# Patient Record
Sex: MALE | Race: WHITE
[De-identification: names, ages, dates, MRNs, and addresses within clinical notes are randomized per-mention and may not be internally consistent; named-entity substitution may affect disease eponyms.]

---
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
## 2024-03-29 ENCOUNTER — APPOINTMENT (OUTPATIENT)
Dept: NEUROLOGY | Facility: CLINIC | Age: 19
End: 2024-03-29

## 2024-04-21 ENCOUNTER — NON-APPOINTMENT (OUTPATIENT)
Age: 19
End: 2024-04-21

## 2024-04-22 ENCOUNTER — EMERGENCY (EMERGENCY)
Facility: HOSPITAL | Age: 19
LOS: 0 days | Discharge: HOME | End: 2024-04-22
Attending: EMERGENCY MEDICINE
Payer: COMMERCIAL

## 2024-04-22 VITALS
OXYGEN SATURATION: 98 % | HEART RATE: 92 BPM | TEMPERATURE: 97 F | RESPIRATION RATE: 18 BRPM | DIASTOLIC BLOOD PRESSURE: 82 MMHG | SYSTOLIC BLOOD PRESSURE: 142 MMHG

## 2024-04-22 VITALS — HEIGHT: 75 IN

## 2024-04-22 DIAGNOSIS — M54.2 CERVICALGIA: ICD-10-CM

## 2024-04-22 DIAGNOSIS — M43.6 TORTICOLLIS: ICD-10-CM

## 2024-04-22 PROCEDURE — 96372 THER/PROPH/DIAG INJ SC/IM: CPT

## 2024-04-22 PROCEDURE — 99284 EMERGENCY DEPT VISIT MOD MDM: CPT

## 2024-04-22 PROCEDURE — 99283 EMERGENCY DEPT VISIT LOW MDM: CPT | Mod: 25

## 2024-04-22 RX ORDER — IBUPROFEN 200 MG
1 TABLET ORAL
Qty: 28 | Refills: 0
Start: 2024-04-22 | End: 2024-04-28

## 2024-04-22 RX ORDER — DIAZEPAM 5 MG
5 TABLET ORAL ONCE
Refills: 0 | Status: DISCONTINUED | OUTPATIENT
Start: 2024-04-22 | End: 2024-04-22

## 2024-04-22 RX ORDER — KETOROLAC TROMETHAMINE 30 MG/ML
30 SYRINGE (ML) INJECTION ONCE
Refills: 0 | Status: DISCONTINUED | OUTPATIENT
Start: 2024-04-22 | End: 2024-04-22

## 2024-04-22 RX ORDER — LIDOCAINE 4 G/100G
1 CREAM TOPICAL
Qty: 14 | Refills: 0
Start: 2024-04-22 | End: 2024-04-28

## 2024-04-22 RX ORDER — LIDOCAINE 4 G/100G
1 CREAM TOPICAL ONCE
Refills: 0 | Status: COMPLETED | OUTPATIENT
Start: 2024-04-22 | End: 2024-04-22

## 2024-04-22 RX ORDER — LIDOCAINE HCL 20 MG/ML
5 VIAL (ML) INJECTION
Refills: 0
Start: 2024-04-22

## 2024-04-22 RX ORDER — CYCLOBENZAPRINE HYDROCHLORIDE 10 MG/1
10 TABLET, FILM COATED ORAL ONCE
Refills: 0 | Status: COMPLETED | OUTPATIENT
Start: 2024-04-22 | End: 2024-04-22

## 2024-04-22 RX ORDER — CYCLOBENZAPRINE HYDROCHLORIDE 10 MG/1
1 TABLET, FILM COATED ORAL
Qty: 21 | Refills: 0
Start: 2024-04-22 | End: 2024-04-28

## 2024-04-22 RX ADMIN — Medication 5 MILLIGRAM(S): at 12:31

## 2024-04-22 RX ADMIN — Medication 30 MILLIGRAM(S): at 11:33

## 2024-04-22 RX ADMIN — LIDOCAINE 1 PATCH: 4 CREAM TOPICAL at 12:32

## 2024-04-22 RX ADMIN — CYCLOBENZAPRINE HYDROCHLORIDE 10 MILLIGRAM(S): 10 TABLET, FILM COATED ORAL at 11:30

## 2024-04-22 NOTE — ED ADULT NURSE NOTE - OBJECTIVE STATEMENT
The pt is a 18 y/o male A&OX4 ambulatory from home presenting to the ED c/o neck pain and cold symptoms. Mom reports that they went to urgent care this morning and told them to go to the ER. Pt reports that he has been having a cough and runny noise x7 days and woke up this morning with R sided neck stiffness. Pt reports that he can't move head to the R side. Respirations even and unlabored, no distress noted. Mom at bedside.

## 2024-04-22 NOTE — ED STATDOCS - PATIENT PORTAL LINK FT
You can access the FollowMyHealth Patient Portal offered by [facility] by registering at the following website: [email or web address]. By joining [employer]’s FollowMyHealth portal, you will also be able to view your health information using other applications (apps) compatible with our system.

## 2024-04-22 NOTE — ED ADULT TRIAGE NOTE - CHIEF COMPLAINT QUOTE
Patient presents to the ER from urgent care with complaints of neck stiffness, worsening pain with movement. Patient has had pain, cough, congestion, headache and swollen lymph nodes in neck for approx 3 days since coming home from college. Denies fever, N/V, chest pain, shortness of breath.

## 2024-04-22 NOTE — ED STATDOCS - PHYSICAL EXAMINATION
Constitutional: NAD AOx3  Eyes: PERRL EOMI  Head: Normocephalic atraumatic  Mouth: MMM. normal oropharynx.   Cardiac: regular rate and rhythm  Resp: Lungs CTAB  GI: Abd s/nd/nt  MSK: Right cervical paraspinal TTP. SCM TTP. Head turned to left for comfort and when turning to right he has pain. No midline TTP. No lymphadenopathy.   Neuro: CN2-12 grossly intact, RANKIN x 4  Skin: No visible rashes

## 2024-04-22 NOTE — ED ADULT NURSE NOTE - NSFALLRISKINTERV_ED_ALL_ED

## 2024-04-22 NOTE — ED STATDOCS - ADDITIONAL NOTES AND INSTRUCTIONS:
Pt with acute torticollis on the right side of his neck will require additional medical treatment to relieve the muscular pain before it is safe to drive and return to classes.  This medication will make the patient drowsy.  He should not be driving or operating machinery while taking it.

## 2024-04-22 NOTE — ED STATDOCS - OBJECTIVE STATEMENT
18 y/o male presents to the ED from urgent care for right sided neck swelling and stiffness starting this morning. Pt reports he had a fever and HA 2 days ago. Pt has not had a fever since. Pt worked out yesterday. NKDA. No other complaints at this time.

## 2024-04-22 NOTE — ED STATDOCS - PROGRESS NOTE DETAILS
19-year-old male with no known past medical history presents to the ED sent in from urgent care for evaluation of increased lymph nodes to the right side of his neck to rule out meningitis patient reports that he woke up this morning with pain to the right side of his neck only difficulty turning his head more comfortable holding his head turned to the left side patient denies trauma to head or neck does report that he worked out yesterday and did new exercises overhead for his back patient denies numbness or tingling in upper extremities no weakness to upper extremities no headache no high fevers no chills no nausea no vomiting no visual changes no rash patient did have a low-grade fever 2 days ago denies any sort of sore throat did have mild cough on exam patient holding head turned to left side right trapezius muscles sternocleidomastoid muscles are tender to palp and spasms nontender cervical spine no cervical lymphadenopathy palp oropharynx pink without lesions chest is clear to auscultation bilaterally with regular rate and rhythm full active range of motion of upper extremities neurovascularly intact will reevaluate patient's approximately 45 minutes to 1 hour after receiving medications to see if there is improvement heating pads applied to right side of neck.  Debbie Montes PA-C On reeval patient reports minimal improvement to right side neck stiffness after Flexeril and Toradol on reexam patient still holding head turned towards left side with right-sided musculature spasm and tender to palp no palpable lymphadenopathy will add Valium and lido patch and reevaluate.  Debbie Montes PA-C

## 2024-04-22 NOTE — ED STATDOCS - NSFOLLOWUPINSTRUCTIONS_ED_ALL_ED_FT
--Euvolemic on exam.   --EF newly reduced from 45-50% in 2017 down to 25-30% on 7/2/22.   --Discuss initiation of ACEi/ARB/ARNi w/ Dr. Eduardo prior to discharge.   --Plan for NST on Tuesday 7/5/22 to r/o ischemic etiology of LVEF reduction. Per Dr. Eduardo, no need to transition from Eliquis to Heparin gtt for NST. Acute Torticollis, Adult  Torticollis is a condition in which the muscles of the neck tighten (contract) abnormally, causing the neck to twist and the head to move into an unnatural position. Torticollis that develops suddenly is called acute torticollis. People with acute torticollis may have trouble turning their head. The condition can be painful and may range from mild to severe.    What are the causes?  This condition may be caused by:  Sleeping in an awkward position. This is common.  Extending or twisting the neck muscles beyond their normal position.  An injury to the neck muscles.  An infection.  A tumor.  Certain medicines.  Long-lasting spasms of the neck muscles.  In some cases, the cause may not be known.    What increases the risk?  You are more likely to develop this condition if:  You have a condition associated with loose ligaments, such as Down syndrome.  You have a brain condition that affects vision, such as strabismus.  What are the signs or symptoms?  The main symptom of this condition is tilting of the head to one side. Other symptoms include:  Pain in the neck.  Trouble turning the head from side to side or up and down.  How is this diagnosed?  This condition may be diagnosed based on:  A physical exam.  Your medical history.  Imaging tests, such as:  An X-ray.  An ultrasound.  A CT scan.  An MRI.  How is this treated?  Treatment for this condition depends on what is causing the condition. Mild cases may go away without treatment. Treatment for more serious cases may include:  Medicines or shots to relax the muscles.  Other medicines, such as antibiotics, to treat the underlying cause.  Wearing a soft neck collar.  Physical therapy and stretching exercises to improve movement and strength in your neck.  Neck massage.  In severe cases, surgery may be needed to repair dislocated or broken bones or to treat nerves in the neck.    Follow these instructions at home:    Take over-the-counter and prescription medicines only as told by your health care provider.  Do stretching exercises and massage your neck as told by your health care provider.  If directed, apply heat to the affected area as often as told by your health care provider. Use the heat source that your health care provider recommends, such as a moist heat pack or a heating pad.  Place a towel between your skin and the heat source.  Leave the heat on for 20–30 minutes.  Remove the heat if your skin turns bright red. This is especially important if you are unable to feel pain, heat, or cold. You have a greater risk of getting burned.  If you wake up with torticollis after sleeping, check your bed or sleeping area. Look for lumpy pillows or unusual objects. Make sure your bed and sleeping area are comfortable.  Keep all follow-up visits. This is important.  Contact a health care provider if:  You have a fever.  Your symptoms do not improve or they get worse.  Get help right away if:  You have trouble breathing.  You make loud, high-pitched sounds when you breathe, most often when you breathe in (stridor).  You start to drool.  You have trouble swallowing or pain when swallowing.  You develop numbness or weakness in your hands or feet.  You have changes in your speech, understanding, or vision.  You are in severe pain.  You cannot move your head or neck.  These symptoms may represent a serious problem that is an emergency. Do not wait to see if the symptoms will go away. Get medical help right away. Call your local emergency services (911 in the [place] Do not drive yourself to the hospital.    Summary  Torticollis is a condition in which the muscles of the neck tighten (contract) abnormally, causing the neck to twist and the head to move into an unnatural position. Torticollis that develops suddenly is called acute torticollis.  Treatment for this condition depends on what is causing the condition. Mild cases may go away without treatment.  Do stretching exercises and massage your neck as told by your health care provider. You may also be instructed to apply heat to the area.  Contact your health care provider if your symptoms do not improve or they get worse.  This information is not intended to replace advice given to you by your health care provider. Make sure you discuss any questions you have with your health care provider.    Document Revised: 04/16/2021 Document Reviewed: 04/16/2021  [employer] Patient Education © 2024 [employer]

## 2024-10-28 ENCOUNTER — APPOINTMENT (OUTPATIENT)
Dept: DERMATOLOGY | Facility: CLINIC | Age: 19
End: 2024-10-28

## 2024-12-16 ENCOUNTER — APPOINTMENT (OUTPATIENT)
Dept: DERMATOLOGY | Facility: CLINIC | Age: 19
End: 2024-12-16
Payer: COMMERCIAL

## 2024-12-16 DIAGNOSIS — Z12.83 ENCOUNTER FOR SCREENING FOR MALIGNANT NEOPLASM OF SKIN: ICD-10-CM

## 2024-12-16 DIAGNOSIS — L81.4 OTHER MELANIN HYPERPIGMENTATION: ICD-10-CM

## 2024-12-16 DIAGNOSIS — B07.9 VIRAL WART, UNSPECIFIED: ICD-10-CM

## 2024-12-16 DIAGNOSIS — D22.9 MELANOCYTIC NEVI, UNSPECIFIED: ICD-10-CM

## 2024-12-16 DIAGNOSIS — L21.9 SEBORRHEIC DERMATITIS, UNSPECIFIED: ICD-10-CM

## 2024-12-16 PROCEDURE — 99204 OFFICE O/P NEW MOD 45 MIN: CPT | Mod: 25

## 2024-12-16 PROCEDURE — 17110 DESTRUCTION B9 LES UP TO 14: CPT

## 2024-12-16 RX ORDER — CLOBETASOL PROPIONATE 0.5 MG/ML
0.05 SOLUTION TOPICAL
Qty: 1 | Refills: 6 | Status: ACTIVE | COMMUNITY
Start: 2024-12-16 | End: 1900-01-01

## 2024-12-16 RX ORDER — KETOCONAZOLE 20 MG/ML
2 SUSPENSION TOPICAL
Qty: 1 | Refills: 0 | Status: ACTIVE | COMMUNITY
Start: 2024-12-16 | End: 1900-01-01

## 2024-12-19 ENCOUNTER — APPOINTMENT (OUTPATIENT)
Dept: ORTHOPEDIC SURGERY | Facility: CLINIC | Age: 19
End: 2024-12-19
Payer: COMMERCIAL

## 2024-12-19 DIAGNOSIS — Z78.9 OTHER SPECIFIED HEALTH STATUS: ICD-10-CM

## 2024-12-19 DIAGNOSIS — S52.571A OTHER INTRAARTICULAR FRACTURE OF LOWER END OF RIGHT RADIUS, INITIAL ENCOUNTER FOR CLOSED FRACTURE: ICD-10-CM

## 2024-12-19 PROCEDURE — 99203 OFFICE O/P NEW LOW 30 MIN: CPT

## 2024-12-19 PROCEDURE — 73110 X-RAY EXAM OF WRIST: CPT | Mod: RT

## 2024-12-19 PROCEDURE — L3982: CPT | Mod: RT

## 2024-12-19 RX ORDER — DEXTROAMPHETAMINE SACCHARATE, AMPHETAMINE ASPARTATE, DEXTROAMPHETAMINE SULFATE, AND AMPHETAMINE SULFATE 2.5; 2.5; 2.5; 2.5 MG/1; MG/1; MG/1; MG/1
10 TABLET ORAL
Refills: 0 | Status: ACTIVE | COMMUNITY

## 2025-01-10 ENCOUNTER — APPOINTMENT (OUTPATIENT)
Dept: ORTHOPEDIC SURGERY | Facility: CLINIC | Age: 20
End: 2025-01-10

## 2025-01-28 ENCOUNTER — APPOINTMENT (OUTPATIENT)
Dept: DERMATOLOGY | Facility: CLINIC | Age: 20
End: 2025-01-28